# Patient Record
Sex: MALE | Race: BLACK OR AFRICAN AMERICAN | Employment: FULL TIME | URBAN - METROPOLITAN AREA
[De-identification: names, ages, dates, MRNs, and addresses within clinical notes are randomized per-mention and may not be internally consistent; named-entity substitution may affect disease eponyms.]

---

## 2019-07-29 ENCOUNTER — HOSPITAL ENCOUNTER (OUTPATIENT)
Dept: ULTRASOUND IMAGING | Age: 54
Discharge: HOME | End: 2019-07-29
Payer: COMMERCIAL

## 2019-07-29 DIAGNOSIS — R09.89 DIMINISHED PULSE: ICD-10-CM

## 2019-07-29 PROCEDURE — 93925 LOWER EXTREMITY STUDY: CPT

## 2024-06-14 ENCOUNTER — OFFICE VISIT (OUTPATIENT)
Age: 59
End: 2024-06-14

## 2024-06-14 VITALS
SYSTOLIC BLOOD PRESSURE: 142 MMHG | RESPIRATION RATE: 16 BRPM | HEIGHT: 73 IN | OXYGEN SATURATION: 98 % | DIASTOLIC BLOOD PRESSURE: 86 MMHG | BODY MASS INDEX: 35.78 KG/M2 | WEIGHT: 270 LBS | TEMPERATURE: 98.6 F | HEART RATE: 75 BPM

## 2024-06-14 DIAGNOSIS — R81 GLUCOSE FOUND IN URINE ON EXAMINATION: ICD-10-CM

## 2024-06-14 DIAGNOSIS — Z02.1 ENCOUNTER FOR PRE-EMPLOYMENT HEALTH SCREENING EXAMINATION: Primary | ICD-10-CM

## 2024-06-14 LAB
BILIRUBIN, URINE, POC: NEGATIVE
BLOOD URINE, POC: NEGATIVE
GLUCOSE URINE, POC: NORMAL
GLUCOSE, POC: 128 MG/DL
KETONES, URINE, POC: NEGATIVE
LEUKOCYTE ESTERASE, URINE, POC: NEGATIVE
NITRITE, URINE, POC: NEGATIVE
PH, URINE, POC: 5.5 (ref 4.6–8)
PROTEIN,URINE, POC: NEGATIVE
SPECIFIC GRAVITY, URINE, POC: 1.02 (ref 1–1.03)
URINALYSIS CLARITY, POC: CLEAR
URINALYSIS COLOR, POC: YELLOW
UROBILINOGEN, POC: NORMAL

## 2024-06-14 RX ORDER — PREGABALIN 50 MG/1
50 CAPSULE ORAL 2 TIMES DAILY
COMMUNITY
Start: 2023-02-09 | End: 2024-08-29

## 2024-06-14 RX ORDER — GLUCAGON 3 MG/1
POWDER NASAL
COMMUNITY
Start: 2023-10-02

## 2024-06-14 RX ORDER — AMPICILLIN TRIHYDRATE 250 MG
1000 CAPSULE ORAL 2 TIMES DAILY
COMMUNITY

## 2024-06-14 RX ORDER — INSULIN ASPART 100 [IU]/ML
INJECTION, SOLUTION INTRAVENOUS; SUBCUTANEOUS
COMMUNITY
Start: 2024-05-10

## 2024-06-14 RX ORDER — ROSUVASTATIN CALCIUM 20 MG/1
20 TABLET, COATED ORAL DAILY
COMMUNITY
Start: 2023-06-30

## 2024-06-14 RX ORDER — INSULIN GLARGINE 100 [IU]/ML
INJECTION, SOLUTION SUBCUTANEOUS
COMMUNITY
Start: 2024-05-09

## 2024-06-14 RX ORDER — TEMAZEPAM 15 MG/1
15 CAPSULE ORAL
COMMUNITY
Start: 2023-06-19 | End: 2024-08-29

## 2024-06-14 RX ORDER — BLOOD-GLUCOSE METER
EACH MISCELLANEOUS
COMMUNITY
Start: 2024-02-05

## 2024-06-14 RX ORDER — SILDENAFIL 50 MG/1
50 TABLET, FILM COATED ORAL DAILY PRN
COMMUNITY
Start: 2023-06-19 | End: 2025-03-05

## 2024-06-14 RX ORDER — SEMAGLUTIDE 2.68 MG/ML
INJECTION, SOLUTION SUBCUTANEOUS
COMMUNITY

## 2024-06-14 RX ORDER — LISINOPRIL 20 MG/1
20 TABLET ORAL DAILY
COMMUNITY
Start: 2023-05-18

## 2024-06-14 RX ORDER — TRAMADOL HYDROCHLORIDE 50 MG/1
50 TABLET ORAL 2 TIMES DAILY PRN
COMMUNITY

## 2024-06-14 RX ORDER — NALOXONE HYDROCHLORIDE 4 MG/.1ML
4 SPRAY NASAL
COMMUNITY
Start: 2023-12-08

## 2024-06-14 NOTE — PROGRESS NOTES
PROGRESS NOTE    SUBJECTIVE:   Sukumar Silvestre is a 58 y.o. male seen for required employment physical.    Current Outpatient Medications   Medication Sig Dispense Refill    Glucose Blood (BLOOD GLUCOSE TEST STRIPS 333 VI) by Other route 3 times daily as needed      Cinnamon 500 MG CAPS Take 2 capsules by mouth 2 times daily      empagliflozin (JARDIANCE) 25 MG tablet Take 1 tablet by mouth daily      Glucagon (BAQSIMI ONE PACK) 3 MG/DOSE POWD 3 mg (equal to 1 spray) into one nostril as needed for severe hypoglycemia.  May repeat dose in 15 minutes if patient remains unresponsive.      Insulin Aspart FlexPen 100 UNIT/ML SOPN INJECT 8 UNITS AT BREAKFAST, 8 UNITS AT LUNCH, AND 9 UNITS AT DINNER, PLUS SLIDING SCALE FOR GLUCOSE OVER 300, MAX DAILY DOSE OF 50 UNITS      BASAGLAR KWIKPEN 100 UNIT/ML injection pen Basaglar, Inject 68 units  at 10 pm      lisinopril (PRINIVIL;ZESTRIL) 20 MG tablet Take 1 tablet by mouth daily      metFORMIN (GLUCOPHAGE) 1000 MG tablet Take 1 tablet (1000mg) At breakfast and 1 tablet (1000mg ) at supper      pregabalin (LYRICA) 50 MG capsule Take 1 capsule by mouth 2 times daily.      rosuvastatin (CRESTOR) 20 MG tablet Take 1 tablet by mouth daily      OZEMPIC, 2 MG/DOSE, 8 MG/3ML SOPN sc injection INJECT 2MG UNDER THE SKIN ONCE A WEEK      temazepam (RESTORIL) 15 MG capsule Take 1 capsule by mouth.      traMADol (ULTRAM) 50 MG tablet Take 1 tablet by mouth 2 times daily as needed.      sildenafil (VIAGRA) 50 MG tablet Take 1 tablet by mouth daily as needed      naloxone 4 MG/0.1ML LIQD nasal spray 1 spray once as needed      Blood Glucose Monitoring Suppl ([employer] BLOOD GLUCOSE METER) w/Device KIT Use as instructed to check blood glucose. Patient's choice of glucometer.      Omega-3 Fatty Acids (FISH OIL ADULT GUMMIES PO) Take by mouth      Multiple Vitamins-Minerals (MULTIVITAL PO) Take by mouth       No current facility-administered medications for this visit.        No Known

## 2025-02-12 ENCOUNTER — OFFICE VISIT (OUTPATIENT)
Age: 60
End: 2025-02-12

## 2025-02-12 VITALS
SYSTOLIC BLOOD PRESSURE: 135 MMHG | RESPIRATION RATE: 16 BRPM | TEMPERATURE: 98.1 F | HEART RATE: 86 BPM | OXYGEN SATURATION: 98 % | DIASTOLIC BLOOD PRESSURE: 76 MMHG

## 2025-02-12 DIAGNOSIS — Z00.00 LABORATORY EXAM ORDERED AS PART OF ROUTINE GENERAL MEDICAL EXAMINATION: ICD-10-CM

## 2025-02-12 DIAGNOSIS — E78.5 HYPERLIPIDEMIA, UNSPECIFIED HYPERLIPIDEMIA TYPE: ICD-10-CM

## 2025-02-12 DIAGNOSIS — Z02.89 EXAMINATION, PHYSICAL, EMPLOYEE: Primary | ICD-10-CM

## 2025-02-12 ASSESSMENT — ENCOUNTER SYMPTOMS
RESPIRATORY NEGATIVE: 1
BACK PAIN: 1
SHORTNESS OF BREATH: 0

## 2025-02-12 NOTE — PROGRESS NOTES
Lumbar back: Tenderness present. No swelling, lacerations or bony tenderness. Decreased range of motion.      Right lower leg: No edema.      Left lower leg: No edema.   Skin:     General: Skin is warm and dry.      Comments: Exam on exposed areas only   Neurological:      General: No focal deficit present.      Mental Status: He is alert and oriented to person, place, and time.      Motor: No weakness.      Coordination: Coordination normal.      Gait: Gait normal.   Psychiatric:         Mood and Affect: Mood normal.         Behavior: Behavior normal.         Thought Content: Thought content normal.         Judgment: Judgment normal.     Right AC x 1 stick, no difficulty, site normal    ASSESSMENT and PLAN    Sukumar JACQUES was seen today for employee physical.    Diagnoses and all orders for this visit:   Diagnosis Orders   1. Examination, physical, employee        2. Laboratory exam ordered as part of routine general medical examination  Lipid Panel W/ Chol/HDL Ratio ([facility] Default)      3. Hyperlipidemia, unspecified hyperlipidemia type  Lipid Panel W/ Chol/HDL Ratio ([facility] Default)       TSH cancelled. Patient had drawn 11/2024 and normal    Keep appointment with Primary Care Provider and Endocrinologist to monitor Diabetes control and treatment. Get a copy of lab results to take to Primary Care Provider and Endocrinologist appointment.  Discussed risk of Diabetes- patient verbalizes understanding and reports following up with podiatrist for foot care and eye doctor for annual eye exam.    Follow up at the [facility] as needed and for  any questions or concerns about lab results    _x__Physically meets Annual Health Physical Requirements.      ___Does not meet Annual Health Physical Requirements. Referred to Primary Care Physician     Counseled on benefits of having a primary care provider which includes, but is not limited to, continuity of care and having a medical home when concerns arise.

## 2025-02-13 LAB
CHOLEST SERPL-MCNC: 112 MG/DL (ref 100–199)
HDLC SERPL-MCNC: 35 MG/DL
LDLC SERPL CALC-MCNC: 58 MG/DL (ref 0–99)
LDLC/HDLC SERPL: 1.7 RATIO (ref 0–3.6)
TRIGL SERPL-MCNC: 97 MG/DL (ref 0–149)
VLDLC SERPL CALC-MCNC: 19 MG/DL (ref 5–40)